# Patient Record
Sex: MALE | Race: WHITE
[De-identification: names, ages, dates, MRNs, and addresses within clinical notes are randomized per-mention and may not be internally consistent; named-entity substitution may affect disease eponyms.]

---

## 2018-01-28 ENCOUNTER — HOSPITAL ENCOUNTER (EMERGENCY)
Dept: HOSPITAL 94 - ER | Age: 58
Discharge: HOME | End: 2018-01-28
Payer: MEDICARE

## 2018-01-28 VITALS — SYSTOLIC BLOOD PRESSURE: 139 MMHG | DIASTOLIC BLOOD PRESSURE: 95 MMHG

## 2018-01-28 VITALS — HEIGHT: 72 IN | WEIGHT: 220.46 LBS | BODY MASS INDEX: 29.86 KG/M2

## 2018-01-28 DIAGNOSIS — I50.9: ICD-10-CM

## 2018-01-28 DIAGNOSIS — Z88.0: ICD-10-CM

## 2018-01-28 DIAGNOSIS — Z76.0: Primary | ICD-10-CM

## 2018-01-28 DIAGNOSIS — I11.0: ICD-10-CM

## 2018-01-28 PROCEDURE — 99283 EMERGENCY DEPT VISIT LOW MDM: CPT

## 2018-04-23 ENCOUNTER — HOSPITAL ENCOUNTER (EMERGENCY)
Dept: HOSPITAL 94 - ER | Age: 58
LOS: 1 days | Discharge: HOME | End: 2018-04-24
Payer: MEDICARE

## 2018-04-23 VITALS — WEIGHT: 210.21 LBS | HEIGHT: 72 IN | BODY MASS INDEX: 28.47 KG/M2
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
## 2018-04-23 DIAGNOSIS — Z79.82: ICD-10-CM

## 2018-04-23 DIAGNOSIS — Z79.899: ICD-10-CM
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
## 2018-04-23 DIAGNOSIS — S20.212A: Primary | ICD-10-CM

## 2018-04-23 DIAGNOSIS — F15.10: ICD-10-CM
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
## 2018-04-23 DIAGNOSIS — I11.0: ICD-10-CM

## 2018-04-23 DIAGNOSIS — I50.9: ICD-10-CM

## 2018-04-23 PROCEDURE — 71046 X-RAY EXAM CHEST 2 VIEWS: CPT

## 2018-04-23 PROCEDURE — 99284 EMERGENCY DEPT VISIT MOD MDM: CPT

## 2018-04-24 VITALS — SYSTOLIC BLOOD PRESSURE: 110 MMHG | DIASTOLIC BLOOD PRESSURE: 70 MMHG

## 2020-10-10 ENCOUNTER — HOSPITAL ENCOUNTER (EMERGENCY)
Dept: HOSPITAL 76 - ED | Age: 60
Discharge: HOME | End: 2020-10-10
Payer: MEDICARE

## 2020-10-10 VITALS — SYSTOLIC BLOOD PRESSURE: 120 MMHG | DIASTOLIC BLOOD PRESSURE: 91 MMHG
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
## 2020-10-10 DIAGNOSIS — S89.91XA: Primary | ICD-10-CM
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
## 2020-10-10 PROCEDURE — 99283 EMERGENCY DEPT VISIT LOW MDM: CPT

## 2020-10-10 PROCEDURE — 99282 EMERGENCY DEPT VISIT SF MDM: CPT

## 2020-10-10 NOTE — ED PHYSICIAN DOCUMENTATION
PD HPI LOWER EXT INJURY





- Stated complaint


Stated Complaint: RT KNEE INJ





- Chief complaint


Chief Complaint: Trauma Ext





- History obtained from


History obtained from: Patient





- History of Present Illness


PD HPI LOW EXT INJURY LOCATION: Right





- Additional information


Additional information: 





60-year-old gentleman anticoagulated on Xarelto, lives in [place].  He was on

his boat today and fell, he describes his knee everting and has pain along the 

medial joint line of the right knee.  He is able to walk and bear weight.  No 

other injuries.  Declines pain medication on initial evaluation.





Review of Systems


Constitutional: reports: Reviewed and negative


Eyes: reports: Reviewed and negative


Ears: reports: Reviewed and negative


Nose: reports: Reviewed and negative


Throat: reports: Reviewed and negative


Cardiac: reports: Reviewed and negative


Respiratory: reports: Reviewed and negative





PD PAST MEDICAL HISTORY





- Allergies


Allergies/Adverse Reactions: 


                                    Allergies











Allergy/AdvReac Type Severity Reaction Status Date / Time


 


Penicillins Allergy  Edema Verified 10/10/20 18:29














PD ED PE NORMAL





- Vitals


Vital signs reviewed: Yes





- General


General: Alert and oriented X 3, No acute distress





- Extremities


Extremities: Other (Tender along the medial joint line of the right knee.  There

is no effusion.  Negative grind testing.  ACL, PCL, LCL are tight.  MCL unable 

to test due to pain.)





- Neuro


Neuro: Alert and oriented X 3, Normal speech





Results





- Vitals


Vitals: 


                               Vital Signs - 24 hr











  10/10/20





  18:29


 


Temperature 37 C


 


Heart Rate 79


 


Respiratory 18





Rate 


 


Blood Pressure 120/91 H


 


O2 Saturation 99








                                     Oxygen











O2 Source                      Room air

















- Rads (name of study)


  ** R knee XR


Radiology: EMP read contemporaneously (nrmal)





Departure





- Departure


Disposition: 01 Home, Self Care


Clinical Impression: 


Knee injury


Qualifiers:


 Encounter type: initial encounter Laterality: right Qualified Code(s): S89.91XA

- Unspecified injury of right lower leg, initial encounter





Condition: Good


Record reviewed to determine appropriate education?: Yes


Instructions:  ED Sprain Knee


Comments: 


Your examination is consistent today with a sprain of the medial collateral 

ligament of the right knee.  Wear the knee immobilizer as needed for pain. It is

fine to walk and bear weight.  Follow-up with your doctor on return home, 

consider MRI if not improving in a week or 2.

## 2020-10-10 NOTE — XRAY REPORT
PROCEDURE:  Knee 4 View RT

 

INDICATIONS:  knee inj

 

TECHNIQUE:  4 views of the right knee(s) were acquired.  

 

COMPARISON:  None.

 

FINDINGS:  

 

Bones:  No acute fractures or dislocations.  No suspicious bony lesions.  

 

Soft tissues:  No substantial joint effusion.  No suspicious soft tissue calcifications.  

 

IMPRESSION:  Right knee without acute radiographic abnormalities.

 

Reviewed by: Osbaldo Soler MD on 10/10/2020 7:13 PM PDT

Approved by: Osbaldo Soler MD on 10/10/2020 7:13 PM PDT

 

 

Station ID:  [id number]